# Patient Record
(demographics unavailable — no encounter records)

---

## 2024-11-27 NOTE — HISTORY OF PRESENT ILLNESS
[FreeTextEntry1] : He is an 82 year old right handed gentleman.  HPI from [facility] 11/19/24  : 82y M with PMH of  HTN, HLD, and prostate CA s/p XRT was brought in by family for acute confusion and dysarthria  on 11/18. Per chart review and pt, on 11/18 AM, pt woke up and saw his dishes were broken, unable to recall the event, ? LOC, denies any falls. States he must have broke them since he lives alone, but unable to explain the events.  History mostly as per the patient's daughter and son in law. Family states that they went to [facility] around 9 AM to take him to breakfast and when they arrived, the patient was sitting oddly in chair that was in different position than usual surrounded by broken glass and the surrounding area in disarray. At that time patient was more confused compared to his baseline mental status and family noticed that he had soiled himself with feces. State that patient is forgetful but does not have a specific diagnosis and that this presentation was very atypical for the patient. At time of assessment patient has no complaints, is in good spirits, and mentating at baseline per family.   Per D/W pts family, pt lives alone, has been noticing some confusion for about a year, noted to be asking the same questions, some memory issues w/short term memory, but his acute confusion is worse than his baseline.  Workup at [facility] includes: CT Head 11/18/24: Small acute to subacute infarction in the left cerebellar hemisphere. No acute intracranial hemorrhage. 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 No significant associated mass effect. Moderate extent chronic microvascular ischemic changes. TTE 11/19/24: EF 73%, no PFO.  11/27/24. He presents to the office today with his daughter and son-in-law.  On 11/18/2024, his daughter and son-in-law went to his home and noticed that he was "out of it" and was in a daze/confused.  He was also noted to be slurring his speech at the time and had some hallucinations.  He presented to the ED at [facility] and was found to have a subacute infarct.  He has since returned to his baseline and denies any new focal neurologic symptoms.    Of note, he is in remission from prostate cancer.  PCP Dr. Asim Ware ([facility])

## 2024-11-27 NOTE — CONSULT LETTER
[Dear  ___] : Dear  [unfilled], [Consult Letter:] : I had the pleasure of evaluating your patient, [unfilled]. [Please see my note below.] : Please see my note below. [Consult Closing:] : Thank you very much for allowing me to participate in the care of this patient.  If you have any questions, please do not hesitate to contact me. [Sincerely,] : Sincerely, [FreeTextEntry2] : Asim Colby MD [FreeTextEntry3] : Court Liu, FNP-BC [DrOrly  ___] : Dr. MONAHAN

## 2024-11-27 NOTE — DISCUSSION/SUMMARY
[FreeTextEntry1] : He has diminished hearing which is chronic and mild gait instability, which is likely multifactorial in origin.  He is otherwise neurologically intact.  To summarize, on 11/18/2024, his family brought him to the hospital due to a change in mental status.  While in the hospital, he was noted to have left hemiparesis; this has since resolved.  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  However, the infarct would not readily explain his altered mental status or hemiparesis.    -He did not have an MRI brain in the hospital due to inability to lay flat he also has not yet had vessel imaging, which should be done to complete his stroke workup.  I have requested an MRI brain and MRA head and neck, which he can have at a stand-up MRI facility.  The MRI brain will also be helpful to determine if there were other areas of infarction, perhaps better accounting for his symptoms. -I recommend he consult with Dr. Ware (cardiology) for an implantable loop recorder to screen for occult atrial fibrillation. -He should benefit from aggressive vascular risk factor control.  We discussed the importance of BP control, and I recommend he start checking his BP at home to better ascertain  control. -He should continue to take aspirin and Plavix X 3 weeks from the stroke, followed by aspirin monotherapy. - He endorses snoring, raising concern for obstructive sleep apnea. I have requested a home sleep study.  He can follow-up in 2 months with Dopplers.  I hope he remains free of further serious trouble.

## 2024-11-27 NOTE — PHYSICAL EXAM
[FreeTextEntry1] :  Neurologic examination:  Mental status:  The patient is alert, attentive, and oriented. Speech is clear and fluent with good  comprehension.  Cranial nerves:  CN II: Visual fields are full to confrontation.   CN III, IV, VI: At primary gaze, there is no eye deviation.EOMI. No ptosis  CN V: Facial sensation is intact bilaterally.  CN VII: Face is symmetric with normal eye closure and smile.  CN VII: Hearing is diminished.  CN IX, X: Palate elevates symmetrically. Phonation is normal.  CN XI: Head turning and shoulder shrug are intact  CN XII: Tongue is midline with normal movements and no atrophy.  Motor:  There is no pronator drift of out-stretched arms. Muscle bulk and tone are normal. Strength is full bilaterally.  Sensory:  Light touch intact in fingers and toes.  Coordination:  Fine finger movements are intact. There is no dysmetria on finger-to-nose.   Gait/Stance:  His posture is stooped and his gait is mildly unsteady. Tandem was not tested. Romberg was absent.  [General Appearance - Alert] : alert [General Appearance - In No Acute Distress] : in no acute distress [Oriented To Time, Place, And Person] : oriented to person, place, and time [Affect] : the affect was normal [Mood] : the mood was normal [Sclera] : the sclera and conjunctiva were normal [Extraocular Movements] : extraocular movements were intact [Full Visual Field] : full visual field [Outer Ear] : the ears and nose were normal in appearance [Examination Of The Oral Cavity] : the lips and gums were normal [Neck Appearance] : the appearance of the neck was normal [Auscultation Breath Sounds / Voice Sounds] : lungs were clear to auscultation bilaterally [Heart Sounds] : normal S1 and S2 [Abnormal Walk] : normal gait [Nail Clubbing] : no clubbing  or cyanosis of the fingernails [Musculoskeletal - Swelling] : no joint swelling seen [Motor Tone] : muscle strength and tone were normal [Skin Color & Pigmentation] : normal skin color and pigmentation [Skin Turgor] : normal skin turgor [] : no rash

## 2025-02-13 NOTE — HISTORY OF PRESENT ILLNESS
[FreeTextEntry1] : He is an 83 year old right handed gentleman.  HPI from [facility] 11/19/24  : 82y M with PMH of  HTN, HLD, and prostate CA s/p XRT was brought in by family for acute confusion and dysarthria  on 11/18. Per chart review and pt, on 11/18 AM, pt woke up and saw his dishes were broken, unable to recall the event, ? LOC, denies any falls. States he must have broke them since he lives alone, but unable to explain the events.  History mostly as per the patient's daughter and son in law. Family states that [facility] to take him to breakfast and when they arrived, the patient was sitting oddly in chair that was in different position than usual surrounded by broken glass and the surrounding area in disarray. At that time patient was more confused compared to his baseline mental status and family noticed that he had soiled himself with feces. State that patient is forgetful but does not have a specific diagnosis and that this presentation was very atypical for the patient. At time of assessment patient has no complaints, is in good spirits, and mentating at baseline per family.   Per D/W pts family, pt lives alone, has been noticing some confusion for about a year, noted to be asking the same questions, some memory issues w/short term memory, but his acute confusion is worse than his baseline.  Workup at [facility] includes: CT Head 11/18/24: Small acute to subacute infarction in the left cerebellar hemisphere. No acute intracranial hemorrhage. 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 No significant associated mass effect. Moderate extent chronic microvascular ischemic changes. TTE 11/19/24: EF 73%, no PFO.  11/27/24. He presents to the office today with his daughter and son-in-law.  On 11/18/2024, his daughter and son-in-law went to his home and noticed that he was "out of it" and was in a daze/confused.  He was also noted to be slurring his speech at the time and had some hallucinations.  He presented to the ED at [facility] and was found to have a subacute infarct.  He has since returned to his baseline and denies any new focal neurologic symptoms.    Of note, he is in remission from prostate cancer.  MRA Neck at Standup MRI (per report only) 12/30/24: Patient motion artifact degrades the quality of the exam. The left vertebral artery is dominant. No hemodynamically significant stenosis.   2/13/25 He presents to the office today with his daughter and son-in-law.  He is feeling well and denies any new focal neurologic symptoms.  His daughter notes that he has short-term memory deficits.  SBP usually 140-150s--he checks it sporadically   Carotid Doppler 2/13/2025: Mild, approximately 45% right ICA stenosis.  No flow in the left ICA consistent with an occlusion- there is reconstitution of flow at the distal part of the ICA. Incidental finding: Irregular heart rate and right thyroid nodules.  [phone]----Tabitha--daughter call w results  PCP Dr. Asim Ware ([facility])

## 2025-02-13 NOTE — CONSULT LETTER
[Dear  ___] : Dear  [unfilled], [Consult Letter:] : I had the pleasure of evaluating your patient, [unfilled]. [Please see my note below.] : Please see my note below. [Consult Closing:] : Thank you very much for allowing me to participate in the care of this patient.  If you have any questions, please do not hesitate to contact me. [Sincerely,] : Sincerely, [DrOrly  ___] : Dr. MONAHAN [FreeTextEntry2] : Asim Colby MD [FreeTextEntry3] : Court Liu, FNP-BC

## 2025-02-13 NOTE — DISCUSSION/SUMMARY
[FreeTextEntry1] : 11/27/24 He has diminished hearing which is chronic and mild gait instability, which is likely multifactorial in origin.  He is otherwise neurologically intact.  To summarize, on 11/18/2024, his family brought him to the hospital due to a change in mental status.  While in the hospital, he was noted to have left hemiparesis; this has since resolved.  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  However, the infarct would not readily explain his altered mental status or hemiparesis.    -He did not have an MRI brain in the hospital due to inability to lay flat he also has not yet had vessel imaging, which should be done to complete his stroke workup.  I have requested an MRI brain and MRA head and neck, which he can have at a stand-up MRI facility.  The MRI brain will also be helpful to determine if there were other areas of infarction, perhaps better accounting for his symptoms. -I recommend he consult with Dr. Ware (cardiology) for an implantable loop recorder to screen for occult atrial fibrillation. -He should benefit from aggressive vascular risk factor control.  We discussed the importance of BP control, and I recommend he start checking his BP at home to better ascertain  control. -He should continue to take aspirin and Plavix X 3 weeks from the stroke, followed by aspirin monotherapy. - He endorses snoring, raising concern for obstructive sleep apnea. I have requested a home sleep study.  2/13/25 -Overall he is neurologically stable. -Dopplers done today demonstrated a left carotid occlusion, which was not appreciated on his outside MRA.  However, the MRA was degraded by motion artifact.  Nonetheless, this finding is incidental/asymptomatic.  For further clarification, I have requested a CTA head and neck. -I have not yet received the reports from his outside MRI brain and MRA head.  He brought a CD of the imaging, which I gave to Pretty to upload to PACS. -I defer to you regarding whether the irregular heart rate and thyroid nodules incidentally detected on Doppler, warrant further investigation. -His BP was highly elevated in the office today.  However, he has not yet taken his antihypertensives and he was frustrated due to scheduling conflicts etc.  I have advised him to check his BP as soon as he gets home and to monitor his response after taking his antihypertensive medication.  If his SBP remains 180 or above, I urged him to present to the ED for further BP management.  I also urged him to start checking his BP daily to better ascertain control at home. -He should continue to benefit from aggressive vascular risk factor control.  In terms of lipids, target LDL should be less than 55.  He should continue to take aspirin for secondary stroke prevention. -His daughter notes that he is been experiencing short-term memory deficits, which I suspect represents a mild cognitive impairment or mild dementia, possibly vascular in origin.  Nonetheless, if not recently done please check a CBC, CMP, B12, TFTs, homocysteine, and RPR to evaluate for reversible causes of memory loss.  If his symptoms progress or worsen, we consider consultation with one of our memory specialists and/or a neuropsychological evaluation. -At her last visit, I recommended an ILR and home sleep study.  He is refusing both.  He can follow-up in 6 months with repeat Dopplers.  I hope he remains free of further serious trouble.

## 2025-02-13 NOTE — PHYSICAL EXAM
[General Appearance - Alert] : alert [General Appearance - In No Acute Distress] : in no acute distress [Oriented To Time, Place, And Person] : oriented to person, place, and time [Affect] : the affect was normal [Mood] : the mood was normal [Sclera] : the sclera and conjunctiva were normal [Extraocular Movements] : extraocular movements were intact [Full Visual Field] : full visual field [Outer Ear] : the ears and nose were normal in appearance [Examination Of The Oral Cavity] : the lips and gums were normal [Neck Appearance] : the appearance of the neck was normal [Auscultation Breath Sounds / Voice Sounds] : lungs were clear to auscultation bilaterally [Heart Sounds] : normal S1 and S2 [Abnormal Walk] : normal gait [Nail Clubbing] : no clubbing  or cyanosis of the fingernails [Musculoskeletal - Swelling] : no joint swelling seen [Motor Tone] : muscle strength and tone were normal [Skin Color & Pigmentation] : normal skin color and pigmentation [Skin Turgor] : normal skin turgor [] : no rash [FreeTextEntry1] :  Neurologic examination:  Mental status:  The patient is alert, attentive, and oriented. Speech is clear and fluent with good  comprehension.  Cranial nerves:  CN II: Visual fields are full to confrontation.   CN III, IV, VI: At primary gaze, there is no eye deviation.EOMI. No ptosis  CN V: Facial sensation is intact bilaterally.  CN VII: Face is symmetric with normal eye closure and smile.  CN VII: Hearing is diminished.  CN IX, X: Palate elevates symmetrically. Phonation is normal.  CN XI: Head turning and shoulder shrug are intact  CN XII: Tongue is midline with normal movements and no atrophy.  Motor:  There is no pronator drift of out-stretched arms. Muscle bulk and tone are normal. Strength is full bilaterally.  Sensory:  Light touch intact in fingers and toes.  Coordination:  Fine finger movements are intact. There is no dysmetria on finger-to-nose.   Gait/Stance:  His posture is stooped and his gait is mildly unsteady. Tandem was not tested. Romberg was absent.